# Patient Record
Sex: FEMALE | Race: OTHER | URBAN - NONMETROPOLITAN AREA
[De-identification: names, ages, dates, MRNs, and addresses within clinical notes are randomized per-mention and may not be internally consistent; named-entity substitution may affect disease eponyms.]

---

## 2023-01-14 ENCOUNTER — HOSPITAL ENCOUNTER (OUTPATIENT)
Age: 28
Setting detail: SPECIMEN
Discharge: HOME | End: 2023-01-14
Payer: COMMERCIAL

## 2023-01-14 ENCOUNTER — OFFICE VISIT (OUTPATIENT)
Dept: PRIMARY CARE CLINIC | Age: 28
End: 2023-01-14
Payer: COMMERCIAL

## 2023-01-14 VITALS
WEIGHT: 114 LBS | HEIGHT: 69 IN | OXYGEN SATURATION: 99 % | DIASTOLIC BLOOD PRESSURE: 60 MMHG | BODY MASS INDEX: 16.88 KG/M2 | HEART RATE: 94 BPM | TEMPERATURE: 98.9 F | SYSTOLIC BLOOD PRESSURE: 98 MMHG

## 2023-01-14 DIAGNOSIS — N92.6 IRREGULAR MENSTRUAL BLEEDING: ICD-10-CM

## 2023-01-14 DIAGNOSIS — R22.1 NECK MASS: ICD-10-CM

## 2023-01-14 DIAGNOSIS — R22.1 NECK MASS: Primary | ICD-10-CM

## 2023-01-14 LAB
ABSOLUTE EOS #: 0.15 K/UL (ref 0–0.44)
ABSOLUTE IMMATURE GRANULOCYTE: 0.03 K/UL (ref 0–0.3)
ABSOLUTE LYMPH #: 2.45 K/UL (ref 1.1–3.7)
ABSOLUTE MONO #: 0.6 K/UL (ref 0.1–1.2)
ALBUMIN SERPL-MCNC: 4.4 G/DL (ref 3.5–5.2)
ALBUMIN/GLOBULIN RATIO: 1.5 (ref 1–2.5)
ALP BLD-CCNC: 81 U/L (ref 35–104)
ALT SERPL-CCNC: 17 U/L (ref 5–33)
ANION GAP SERPL CALCULATED.3IONS-SCNC: 11 MMOL/L (ref 9–17)
AST SERPL-CCNC: 20 U/L
BASOPHILS # BLD: 1 % (ref 0–2)
BASOPHILS ABSOLUTE: 0.09 K/UL (ref 0–0.2)
BILIRUB SERPL-MCNC: 0.2 MG/DL (ref 0.3–1.2)
BUN BLDV-MCNC: 15 MG/DL (ref 6–20)
BUN/CREAT BLD: 28 (ref 9–20)
CALCIUM SERPL-MCNC: 9.3 MG/DL (ref 8.6–10.4)
CHLORIDE BLD-SCNC: 101 MMOL/L (ref 98–107)
CO2: 23 MMOL/L (ref 20–31)
CREAT SERPL-MCNC: 0.53 MG/DL (ref 0.5–0.9)
EOSINOPHILS RELATIVE PERCENT: 2 % (ref 1–4)
GFR SERPL CREATININE-BSD FRML MDRD: >60 ML/MIN/1.73M2
GLUCOSE BLD-MCNC: 100 MG/DL (ref 70–99)
HCG QUALITATIVE: NEGATIVE
HCT VFR BLD CALC: 41.7 % (ref 36.3–47.1)
HEMOGLOBIN: 13.9 G/DL (ref 11.9–15.1)
IMMATURE GRANULOCYTES: 0 %
LYMPHOCYTES # BLD: 26 % (ref 24–43)
MCH RBC QN AUTO: 29.4 PG (ref 25.2–33.5)
MCHC RBC AUTO-ENTMCNC: 33.3 G/DL (ref 25.2–33.5)
MCV RBC AUTO: 88.2 FL (ref 82.6–102.9)
MONOCYTES # BLD: 6 % (ref 3–12)
NRBC AUTOMATED: 0 PER 100 WBC
PDW BLD-RTO: 12.7 % (ref 11.8–14.4)
PLATELET # BLD: 290 K/UL (ref 138–453)
PMV BLD AUTO: 10.4 FL (ref 8.1–13.5)
POTASSIUM SERPL-SCNC: 3.4 MMOL/L (ref 3.7–5.3)
RBC # BLD: 4.73 M/UL (ref 3.95–5.11)
SEG NEUTROPHILS: 65 % (ref 36–65)
SEGMENTED NEUTROPHILS ABSOLUTE COUNT: 6.09 K/UL (ref 1.5–8.1)
SODIUM BLD-SCNC: 135 MMOL/L (ref 135–144)
TOTAL PROTEIN: 7.4 G/DL (ref 6.4–8.3)
TSH SERPL DL<=0.05 MIU/L-ACNC: 2.17 UIU/ML (ref 0.3–5)
WBC # BLD: 9.4 K/UL (ref 3.5–11.3)

## 2023-01-14 PROCEDURE — 80053 COMPREHEN METABOLIC PANEL: CPT

## 2023-01-14 PROCEDURE — 85025 COMPLETE CBC W/AUTO DIFF WBC: CPT

## 2023-01-14 PROCEDURE — 84443 ASSAY THYROID STIM HORMONE: CPT

## 2023-01-14 PROCEDURE — 84703 CHORIONIC GONADOTROPIN ASSAY: CPT

## 2023-01-14 PROCEDURE — 99213 OFFICE O/P EST LOW 20 MIN: CPT | Performed by: NURSE PRACTITIONER

## 2023-01-14 PROCEDURE — 36415 COLL VENOUS BLD VENIPUNCTURE: CPT

## 2023-01-14 SDOH — ECONOMIC STABILITY: FOOD INSECURITY: WITHIN THE PAST 12 MONTHS, YOU WORRIED THAT YOUR FOOD WOULD RUN OUT BEFORE YOU GOT MONEY TO BUY MORE.: NEVER TRUE

## 2023-01-14 SDOH — ECONOMIC STABILITY: FOOD INSECURITY: WITHIN THE PAST 12 MONTHS, THE FOOD YOU BOUGHT JUST DIDN'T LAST AND YOU DIDN'T HAVE MONEY TO GET MORE.: NEVER TRUE

## 2023-01-14 ASSESSMENT — PATIENT HEALTH QUESTIONNAIRE - PHQ9
SUM OF ALL RESPONSES TO PHQ QUESTIONS 1-9: 0
SUM OF ALL RESPONSES TO PHQ9 QUESTIONS 1 & 2: 0
SUM OF ALL RESPONSES TO PHQ QUESTIONS 1-9: 0
1. LITTLE INTEREST OR PLEASURE IN DOING THINGS: 0
2. FEELING DOWN, DEPRESSED OR HOPELESS: 0

## 2023-01-14 ASSESSMENT — ENCOUNTER SYMPTOMS
RESPIRATORY NEGATIVE: 1
ABDOMINAL PAIN: 0
COUGH: 0
NAUSEA: 0
SORE THROAT: 0
VOMITING: 0

## 2023-01-14 ASSESSMENT — SOCIAL DETERMINANTS OF HEALTH (SDOH): HOW HARD IS IT FOR YOU TO PAY FOR THE VERY BASICS LIKE FOOD, HOUSING, MEDICAL CARE, AND HEATING?: NOT HARD AT ALL

## 2023-01-14 NOTE — PROGRESS NOTES
[facility]             [address], [address]                        Telephone [phone]             Fax [phone]     Devin Mcguire  1995  [id number]   Date of visit:  1/14/2023    Subjective:    Devin Mcguire is a 32 y.o.  female who presents to [facility] today (1/14/2023) for evaluation of:    Chief Complaint   Patient presents with    Other     Pt has a lump on neck under her chin. Sx began Thursday. Other  This is a new problem. The current episode started in the past 7 days (01/12/23). The problem occurs constantly. The problem has been unchanged. Pertinent negatives include no abdominal pain, chest pain, chills, congestion, coughing, fever, headaches, nausea, rash, sore throat or vomiting. Associated symptoms comments: Swelling under chin with mild tenderness with palpation. Denies difficulty swallowing or chewing. . Exacerbated by: palpation. She has tried nothing for the symptoms. The treatment provided no relief. She mentioned that for the last 3 months she has spotting daily. She had the contraceptive implant placed in April 2022. She has the following problem list:  There is no problem list on file for this patient. Current medications are:  No current outpatient medications on file. No current facility-administered medications for this visit. She has No Known Allergies. .    She  reports that she has never smoked. She has never used smokeless tobacco.      Objective:    Vitals:    01/14/23 1523   BP: 98/60   Pulse: 94   Temp: 98.9 °F (37.2 °C)   TempSrc: Tympanic   SpO2: 99%   Weight: 114 lb (51.7 kg)   Height: 5' 9\" (1.753 m)     Body mass index is 16.83 kg/m². Review of Systems   Constitutional: Negative. Negative for chills and fever. HENT:  Negative for congestion and sore throat.          Swelling under chin with mild tenderness with palpation   Respiratory: Negative. Negative for cough. Cardiovascular: Negative. Negative for chest pain. Gastrointestinal:  Negative for abdominal pain, nausea and vomiting. Skin:  Negative for rash. Neurological:  Negative for headaches. Physical Exam  Vitals and nursing note reviewed. Constitutional:       Appearance: Normal appearance. She is well-developed. HENT:      Head: Normocephalic. Jaw: There is normal jaw occlusion. Mouth/Throat:      Lips: Pink. Mouth: Mucous membranes are moist.      Pharynx: Oropharynx is clear. Uvula midline. Eyes:      Pupils: Pupils are equal, round, and reactive to light. Neck:      Trachea: Trachea and phonation normal.     Cardiovascular:      Rate and Rhythm: Normal rate and regular rhythm. Heart sounds: Normal heart sounds. Pulmonary:      Effort: Pulmonary effort is normal.      Breath sounds: Normal breath sounds and air entry. Musculoskeletal:      Cervical back: Full passive range of motion without pain, normal range of motion and neck supple. Skin:     General: Skin is warm and dry. Neurological:      General: No focal deficit present. Mental Status: She is alert and oriented to person, place, and time. Psychiatric:         Behavior: Behavior normal.         Thought Content: Thought content normal.       Assessment and Plan:    Hospital Outpatient Visit on 01/14/2023   Component Date Value Ref Range Status    TSH 01/14/2023 2.17  0.30 - 5.00 uIU/mL Final    hCG Qual 01/14/2023 NEGATIVE  NEGATIVE Final    Comment: Specimens with hCG levels near the threshold of the test (25 mIU/mL) may give a negative or   indeterminate result. In such cases, another test should be performed with a new specimen   in 48-72 hours. If early pregnancy is suspected clinically in this setting, correlation   with quantitative serum b-hCG level is suggested.       Glucose 01/14/2023 100 (A)  70 - 99 mg/dL Final    BUN 01/14/2023 15 6 - 20 mg/dL Final    Creatinine 01/14/2023 0.53  0.50 - 0.90 mg/dL Final    Est, Glom Filt Rate 01/14/2023 >60  >60 mL/min/1.73m2 Final    Comment:       Effective Oct 3, 2022        These results are not intended for use in patients <25years of age. eGFR results are calculated without a race factor using the 2021 CKD-EPI equation. Careful clinical correlation is recommended, particularly when comparing to results   calculated using previous equations. The CKD-EPI equation is less accurate in patients with extremes of muscle mass, extra-renal   metabolism of creatine, excessive creatine ingestion, or following therapy that affects   renal tubular secretion.       Bun/Cre Ratio 01/14/2023 28 (A)  9 - 20 Final    Calcium 01/14/2023 9.3  8.6 - 10.4 mg/dL Final    Sodium 01/14/2023 135  135 - 144 mmol/L Final    Potassium 01/14/2023 3.4 (A)  3.7 - 5.3 mmol/L Final    Chloride 01/14/2023 101  98 - 107 mmol/L Final    CO2 01/14/2023 23  20 - 31 mmol/L Final    Anion Gap 01/14/2023 11  9 - 17 mmol/L Final    Alkaline Phosphatase 01/14/2023 81  35 - 104 U/L Final    ALT 01/14/2023 17  5 - 33 U/L Final    AST 01/14/2023 20  <32 U/L Final    Total Bilirubin 01/14/2023 0.2 (A)  0.3 - 1.2 mg/dL Final    Total Protein 01/14/2023 7.4  6.4 - 8.3 g/dL Final    Albumin 01/14/2023 4.4  3.5 - 5.2 g/dL Final    Albumin/Globulin Ratio 01/14/2023 1.5  1.0 - 2.5 Final    WBC 01/14/2023 9.4  3.5 - 11.3 k/uL Final    RBC 01/14/2023 4.73  3.95 - 5.11 m/uL Final    Hemoglobin 01/14/2023 13.9  11.9 - 15.1 g/dL Final    Hematocrit 01/14/2023 41.7  36.3 - 47.1 % Final    MCV 01/14/2023 88.2  82.6 - 102.9 fL Final    MCH 01/14/2023 29.4  25.2 - 33.5 pg Final    MCHC 01/14/2023 33.3  25.2 - 33.5 g/dL Final    RDW 01/14/2023 12.7  11.8 - 14.4 % Final    Platelets 65/25/6879 290  138 - 453 k/uL Final    MPV 01/14/2023 10.4  8.1 - 13.5 fL Final    NRBC Automated 01/14/2023 0.0  0.0 per 100 WBC Final    Seg Neutrophils 01/14/2023 65  36 - 65 % Final    Lymphocytes 01/14/2023 26  24 - 43 % Final    Monocytes 01/14/2023 6  3 - 12 % Final    Eosinophils % 01/14/2023 2  1 - 4 % Final    Basophils 01/14/2023 1  0 - 2 % Final    Immature Granulocytes 01/14/2023 0  0 % Final    Segs Absolute 01/14/2023 6.09  1.50 - 8.10 k/uL Final    Absolute Lymph # 01/14/2023 2.45  1.10 - 3.70 k/uL Final    Absolute Mono # 01/14/2023 0.60  0.10 - 1.20 k/uL Final    Absolute Eos # 01/14/2023 0.15  0.00 - 0.44 k/uL Final    Basophils Absolute 01/14/2023 0.09  0.00 - 0.20 k/uL Final    Absolute Immature Granulocyte 01/14/2023 0.03  0.00 - 0.30 k/uL Final          Diagnosis Orders   1. Neck mass  CBC with Auto Differential    Comprehensive Metabolic Panel    TSH with Reflex    US THYROID      2. Irregular menstrual bleeding  HCG Qualitative, Serum        I discussed lab results with patient during visit. Increase water intake. Potassium was 3.4 and recommended Gatorade or banana daily. She is scheduled for US thyroid on 01/20/23. Take Tylenol or ibuprofen as needed for pain. Follow up with GYN for spotting. Establish and follow up with PCP if symptoms do not improve or worsen. The use, risks, benefits, and side effects of prescribed or recommended medications were discussed. All questions were answered and the patient/caregiver voiced understanding. No orders of the defined types were placed in this encounter.         Electronically signed by RODNEY Casas CNP on 1/14/23 at 3:33 PM EST

## 2023-01-20 ENCOUNTER — HOSPITAL ENCOUNTER (OUTPATIENT)
Dept: ULTRASOUND IMAGING | Age: 28
End: 2023-01-20
Payer: COMMERCIAL

## 2023-01-20 DIAGNOSIS — R22.1 NECK MASS: ICD-10-CM

## 2023-01-20 PROCEDURE — 76536 US EXAM OF HEAD AND NECK: CPT
